# Patient Record
Sex: FEMALE | Race: AMERICAN INDIAN OR ALASKA NATIVE
[De-identification: names, ages, dates, MRNs, and addresses within clinical notes are randomized per-mention and may not be internally consistent; named-entity substitution may affect disease eponyms.]

---

## 2018-05-16 ENCOUNTER — HOSPITAL ENCOUNTER (OUTPATIENT)
Dept: HOSPITAL 42 - ED | Age: 28
Setting detail: OBSERVATION
LOS: 2 days | Discharge: LEFT BEFORE DISCHARGE | End: 2018-05-18
Attending: INTERNAL MEDICINE | Admitting: INTERNAL MEDICINE
Payer: COMMERCIAL

## 2018-05-16 VITALS — BODY MASS INDEX: 33 KG/M2

## 2018-05-16 DIAGNOSIS — Z87.820: ICD-10-CM

## 2018-05-16 DIAGNOSIS — R51: ICD-10-CM

## 2018-05-16 DIAGNOSIS — R94.31: ICD-10-CM

## 2018-05-16 DIAGNOSIS — F84.0: ICD-10-CM

## 2018-05-16 DIAGNOSIS — N39.0: ICD-10-CM

## 2018-05-16 DIAGNOSIS — G47.00: ICD-10-CM

## 2018-05-16 DIAGNOSIS — E78.00: ICD-10-CM

## 2018-05-16 DIAGNOSIS — E66.9: ICD-10-CM

## 2018-05-16 DIAGNOSIS — R41.82: Primary | ICD-10-CM

## 2018-05-16 DIAGNOSIS — F41.0: ICD-10-CM

## 2018-05-16 DIAGNOSIS — F94.0: ICD-10-CM

## 2018-05-16 DIAGNOSIS — R56.9: ICD-10-CM

## 2018-05-16 LAB
ALBUMIN SERPL-MCNC: 4.4 G/DL (ref 3–4.8)
ALBUMIN/GLOB SERPL: 1.1 {RATIO} (ref 1.1–1.8)
ALT SERPL-CCNC: 26 U/L (ref 7–56)
APPEARANCE UR: (no result)
AST SERPL-CCNC: 32 U/L (ref 14–36)
BACTERIA #/AREA URNS HPF: (no result) /[HPF]
BASOPHILS # BLD AUTO: 0.06 K/MM3 (ref 0–2)
BASOPHILS NFR BLD: 0.8 % (ref 0–3)
BILIRUB UR-MCNC: NEGATIVE MG/DL
BUN SERPL-MCNC: 11 MG/DL (ref 7–21)
CALCIUM SERPL-MCNC: 9.2 MG/DL (ref 8.4–10.5)
CK MB SERPL-MCNC: 1.4 NG/ML (ref 0–3.6)
COLOR UR: YELLOW
EOSINOPHIL # BLD: 0.1 10*3/UL (ref 0–0.7)
EOSINOPHIL NFR BLD: 1.5 % (ref 1.5–5)
EPI CELLS #/AREA URNS HPF: (no result) /HPF (ref 0–5)
ERYTHROCYTE [DISTWIDTH] IN BLOOD BY AUTOMATED COUNT: 12 % (ref 11.5–14.5)
GFR NON-AFRICAN AMERICAN: > 60
GLUCOSE UR STRIP-MCNC: NEGATIVE MG/DL
GRANULOCYTES # BLD: 3.23 10*3/UL (ref 1.4–6.5)
GRANULOCYTES NFR BLD: 45.2 % (ref 50–68)
HDLC SERPL-MCNC: 40 MG/DL (ref 29–60)
HGB BLD-MCNC: 13.3 G/DL (ref 12–16)
LDLC SERPL-MCNC: 163 MG/DL (ref 0–129)
LEUKOCYTE ESTERASE UR-ACNC: NEGATIVE LEU/UL
LYMPHOCYTES # BLD: 3.3 10*3/UL (ref 1.2–3.4)
LYMPHOCYTES NFR BLD AUTO: 46.1 % (ref 22–35)
MCH RBC QN AUTO: 27.5 PG (ref 25–35)
MCHC RBC AUTO-ENTMCNC: 33.6 G/DL (ref 31–37)
MCV RBC AUTO: 82 FL (ref 80–105)
MONOCYTES # BLD AUTO: 0.5 10*3/UL (ref 0.1–0.6)
MONOCYTES NFR BLD: 6.4 % (ref 1–6)
PH UR STRIP: 5.5 [PH] (ref 4.7–8)
PLATELET # BLD: 318 10^3/UL (ref 120–450)
PMV BLD AUTO: 9.4 FL (ref 7–11)
PROT UR STRIP-MCNC: 30 MG/DL
RBC # BLD AUTO: 4.83 10^6/UL (ref 3.5–6.1)
RBC # UR STRIP: (no result) /UL
SP GR UR STRIP: >= 1.03 (ref 1–1.03)
T4 FREE SERPL-MCNC: 1.04 NG/DL (ref 0.78–2.19)
UROBILINOGEN UR STRIP-ACNC: 0.2 E.U./DL
WBC # BLD AUTO: 7.2 10^3/UL (ref 4.5–11)

## 2018-05-16 NOTE — CT
EXAM:

  CT Head Without Intravenous Contrast



EXAM DATE/TIME:

  5/16/2018 5:24 PM



CLINICAL HISTORY:

  27 years old, female; Signs and symptoms; Other: ? ? Seizure, anxiety; 

Additional info: ? ? Seizure



TECHNIQUE:

  Axial computed tomography images of the head/brain without intravenous 

contrast.  All CT scans at this facility use one or more dose reduction 

techniques, viz.: automated exposure control; ma/kV adjustment per patient size 

(including targeted exams where dose is matched to indication; i.e. head); or 

iterative reconstruction technique.

  Coronal and sagittal reformatted images were created and reviewed.



COMPARISON:

  There are no prior studies for comparison.



FINDINGS:

  Brain and ventricles:  Ventricles are normal in size and configuration. There 

is no midline shift. There are no intra-axial or extra-axial mass lesions or 

areas of hemorrhage. There are no abnormal fluid collections. Gray-white 

differentiation is maintained.

  Bones: Cranial vault is intact.

  Soft tissues:  unremarkable

  Sinuses:  There is no acute sinusitis.

  Ears and mastoids: Middle ears and mastoids are unremarkable

  Orbits:  Orbital contents are unremarkable.

  



IMPRESSION:  No acute intracranial abnormality

## 2018-05-16 NOTE — ED PDOC
Arrival/HPI





- General


Chief Complaint: Anxiety


Time Seen by Provider: 05/16/18 15:29


Historian: Spouse





- History of Present Illness


Narrative History of Present Illness (Text): 





05/16/18 19:00


28yo female with Past medical history of traumatic brain injury and possible 

secure bib EMS for anxiety/seizure episode. According to patient's  by 

the bedside, patient has been having seizure like episodes intermittently since 

February this year. States she was seen at [facility] and Head CT, 

MRI and EEG was all negative. The  states she was seen by Neurologist, 

Dr. Cortez today and had another episode while she was there. she was brought 

to Emergency department from Dr. Cortez's office. Patient was aphasic, but 

alert and awake. She was teary, not answering questions.











Past Medical History





- Provider Review


Nursing Documentation Reviewed: Yes





- Infectious Disease


Hx of Infectious Diseases: None





- Psychiatric


Hx Substance Use: No





Family/Social History





- Physician Review


Nursing Documentation Reviewed: Yes


Family/Social History: Unknown Family HX


Smoking Status: Unknown If Ever Smoked


Hx Alcohol Use: No


Hx Substance Use: No





Allergies/Home Meds


Allergies/Adverse Reactions: 


Allergies





Penicillins Allergy (Verified 05/16/18 15:18)


 RASH








Home Medications: 


 Home Meds











 Medication  Instructions  Recorded  Confirmed


 


No Known Home Med  05/16/18 05/16/18














Review of Systems





- Physician Review


All systems were reviewed & negative as marked: Yes





- Review of Systems


Constitutional: Normal


Eyes: Normal


ENT: Normal


Respiratory: Normal


Cardiovascular: Normal


Gastrointestinal: Normal


Genitourinary Female: Normal


Musculoskeletal: Normal


Skin: Normal


Neurological: Seizure


Endocrine: Normal


Hemo/Lymphatic: Normal


Psychiatric: Normal





Physical Exam


Vital Signs Reviewed: Yes


Vital Signs











  Temp Pulse Resp BP Pulse Ox


 


 05/16/18 14:41  98.2 F  74  18  123/82  99











Temperature: Afebrile


Blood Pressure: Normal


Pulse: Regular


Respiratory Rate: Normal


Appearance: Positive for: Well-Appearing, Non-Toxic, Comfortable, Other (Teary)


Pain Distress: None


Mental Status: Positive for: Alert and Oriented X 3





- Systems Exam


Head: Present: Atraumatic, Normocephalic


Pupils: Present: PERRL


Extroacular Muscles: Present: EOMI


Conjunctiva: Present: Normal


Mouth: Present: Moist Mucous Membranes


Neck: Present: Normal Range of Motion


Respiratory/Chest: Present: Clear to Auscultation, Good Air Exchange.  No: 

Respiratory Distress, Accessory Muscle Use


Cardiovascular: Present: Regular Rate and Rhythm, Normal S1, S2.  No: Murmurs


Abdomen: No: Tenderness, Distention, Peritoneal Signs


Back: Present: Normal Inspection


Upper Extremity: Present: Normal Inspection.  No: Cyanosis, Edema


Lower Extremity: Present: Normal Inspection.  No: Edema


Neurological: Present: GCS=15, CN II-XII Intact, Speech Normal


Skin: Present: Warm, Dry, Normal Color.  No: Rashes


Psychiatric: Present: Alert, Oriented x 3, Normal Insight, Normal Concentration





Medical Decision Making


ED Course and Treatment: 





05/16/18 20:46


Pt in Emergency department for stated history. Pt is aphasic in Emergency 

department. She was however neurologically intact. 








Her lab was unremarkable. she have UTI and was treated with Rocephin.


Head CT - Negative


Case was DW Dr. Schaffer and he accepted pt for admission for further evaluation.





pt was seen in Emergency department by ELROY Arango and she was 

placed on consult for depression





Dr. Cortez was consult.











- Lab Interpretations


Lab Results: 








 05/16/18 16:55 





 05/16/18 16:55 





 Lab Results





05/16/18 17:00: Beta HCG, Quant < 2.39


05/16/18 16:55: Alcohol, Quantitative < 10


05/16/18 16:55: Sodium 143, Potassium 4.0, Chloride 105, Carbon Dioxide 22, 

Anion Gap 19, BUN 11, Creatinine 0.7, Est GFR (African Amer) > 60, Est GFR (Non-

Af Amer) > 60, Random Glucose 87, Calcium 9.2, Magnesium 1.7, Total Bilirubin 

0.5, AST 32, ALT 26, Alkaline Phosphatase 69, Total Creatine Kinase 196, Total 

Protein 8.4 H, Albumin 4.4, Globulin 4.0, Albumin/Globulin Ratio 1.1


05/16/18 16:55: WBC 7.2, RBC 4.83, Hgb 13.3, Hct 39.6, MCV 82.0, MCH 27.5, MCHC 

33.6, RDW 12.0, Plt Count 318, MPV 9.4, Gran % 45.2 L, Lymph % (Auto) 46.1 H, 

Mono % (Auto) 6.4 H, Eos % (Auto) 1.5, Baso % (Auto) 0.8, Gran # 3.23, Lymph # (

Auto) 3.3, Mono # (Auto) 0.5, Eos # (Auto) 0.1, Baso # (Auto) 0.06











- RAD Interpretation


Radiology Orders: 








05/16/18 17:24


HEAD W/O CONTRAST [CT] Stat 














- Medication Orders


Current Medication Orders: 








Levofloxacin/Dextrose (Levaquin 500mg)  500 mg in 100 mls @ 100 mls/hr IVPB 

STAT STA


   PRN Reason: Protocol


   Stop: 05/16/18 20:51


Sodium Chloride (Sodium Chloride 0.9%)  1,000 mls @ 999 mls/hr IV .Q1H1M STA


   Stop: 05/16/18 20:52





Discontinued Medications





Acetaminophen (Tylenol 325mg Tab)  650 mg PO STAT STA


   Stop: 05/16/18 19:01


   Last Admin: 05/16/18 19:18  Dose: 650 mg





Lorazepam (Ativan)  2 mg IM ONCE ONE


   PRN Reason: Protocol


   Stop: 05/16/18 16:15


   Last Admin: 05/16/18 16:28  Dose: 2 mg





IM Administration Charges


 Document     05/16/18 16:28  GMD  (Rec: 05/16/18 16:28  GMD  [id number])


     Injection Site


      MAR Injection Site                         Left Deltoid


     Charges for Administration


      # of IM Administrations                    1














Disposition/Present on Arrival





- Present on Arrival


Any Indicators Present on Arrival: No


History of DVT/PE: No


History of Uncontrolled Diabetes: No


Urinary Catheter: No


History of Decub. Ulcer: No


History Surgical Site Infection Following: None





- Disposition


Have Diagnosis and Disposition been Completed?: Yes


Diagnosis: 


 Altered mental status, Seizure, Anxiety, UTI (urinary tract infection)





Disposition: HOSPITALIZED


Disposition Time: 18:45


Patient Plan: Admission


Patient Problems: 


 Current Active Problems











Problem Status Onset


 


Altered mental status Acute  


 


Anxiety Acute  


 


Seizure Acute  


 


UTI (urinary tract infection) Acute  











Condition: FAIR

## 2018-05-17 LAB
ALBUMIN SERPL-MCNC: 4 G/DL (ref 3–4.8)
ALBUMIN/GLOB SERPL: 1.1 {RATIO} (ref 1.1–1.8)
ALT SERPL-CCNC: 25 U/L (ref 7–56)
AST SERPL-CCNC: 31 U/L (ref 14–36)
BASOPHILS # BLD AUTO: 0.05 K/MM3 (ref 0–2)
BASOPHILS NFR BLD: 0.7 % (ref 0–3)
BUN SERPL-MCNC: 8 MG/DL (ref 7–21)
CALCIUM SERPL-MCNC: 8.9 MG/DL (ref 8.4–10.5)
CK MB SERPL-MCNC: 1.1 NG/ML (ref 0–3.6)
EOSINOPHIL # BLD: 0.2 10*3/UL (ref 0–0.7)
EOSINOPHIL NFR BLD: 3.4 % (ref 1.5–5)
ERYTHROCYTE [DISTWIDTH] IN BLOOD BY AUTOMATED COUNT: 12.1 % (ref 11.5–14.5)
FOLATE SERPL-MCNC: 9.1 NG/ML
GFR NON-AFRICAN AMERICAN: > 60
GRANULOCYTES # BLD: 2.34 10*3/UL (ref 1.4–6.5)
GRANULOCYTES NFR BLD: 34.5 % (ref 50–68)
HEPATITIS A IGM: NEGATIVE
HEPATITIS B CORE AB: NEGATIVE
HEPATITIS C ANTIBODY: NEGATIVE
HGB BLD-MCNC: 13 G/DL (ref 12–16)
LYMPHOCYTES # BLD: 3.6 10*3/UL (ref 1.2–3.4)
LYMPHOCYTES NFR BLD AUTO: 53.2 % (ref 22–35)
MCH RBC QN AUTO: 27.3 PG (ref 25–35)
MCHC RBC AUTO-ENTMCNC: 33.2 G/DL (ref 31–37)
MCV RBC AUTO: 82.1 FL (ref 80–105)
MONOCYTES # BLD AUTO: 0.6 10*3/UL (ref 0.1–0.6)
MONOCYTES NFR BLD: 8.2 % (ref 1–6)
PLATELET # BLD: 296 10^3/UL (ref 120–450)
PMV BLD AUTO: 9.3 FL (ref 7–11)
RBC # BLD AUTO: 4.76 10^6/UL (ref 3.5–6.1)
T4 FREE SERPL-MCNC: 0.89 NG/DL (ref 0.78–2.19)
T4 SERPL-MCNC: 6.5 UG/DL (ref 5.5–11)
TROPONIN I SERPL-MCNC: < 0.01 NG/ML
VIT B12 SERPL-MCNC: 511 PG/ML (ref 239–931)
WBC # BLD AUTO: 6.8 10^3/UL (ref 4.5–11)

## 2018-05-17 RX ADMIN — ENOXAPARIN SODIUM SCH MG: 30 INJECTION SUBCUTANEOUS at 15:07

## 2018-05-17 RX ADMIN — LEVOFLOXACIN SCH MG: 5 INJECTION, SOLUTION INTRAVENOUS at 15:07

## 2018-05-17 NOTE — CON
DATE:  05/17/2018



HISTORY OF PRESENT ILLNESS:  Shortly, the patient is 27-year-old

-American female, with a reported history of traumatic brain injury.

The patient also has questionable history of anxiety and questionable

history of seizure disorder.  The patient was brought in for evaluation of

possible seizure-like activities observed by her .  Psych consult

was called for evaluation of depressive symptoms.  Also, the patient has

selective mutism, also for evaluation of anxiety.  The patient was seen and

examined.  The patient was selectively mute.  The patient is willing to

talk to this writer, but obviously was not able to do so.  As per ,

she talked earlier today, but not now.  As per , who is next to the

patient, the patient has such behavior when she is stressed out.  The

patient reported that she feels depressed, hopeless and helpless.  She was

answering all of the questions by nodding her head.  The patient reported

at times she feels guilty and hopeless.  When this writer asked about kids,

the patient started to cry, was not able to calm down.  As per , the

patient had 2 kids from the previous relationship, who are 9 and 4 and at

present moment they were adopted.  The patient is not having any visitation

rights.  When this writer asked why she gave up her maternal rights, the

patient's  said it was voluntary because she was not able to take

care of them.  Collaterals were obtained from the , the patient gave

permission to talk to him.  As per , the patient was stressed out

with her work, her  and she lives in adult facility and 

takes care of.  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

she is assisting them with some of their needs.  As per , the

patient was stressed out and asked her  to quit the job and at

present moment they wanted to find a new place to live.  As per ,

the patient was feeling depressed.  The patient attempted to contact

adopted family for her kids and asked to have visitation rights, but family

was uncooperative.  Moreover, the patient got to know that both of the kids

have autistic spectrum disorder.  The patient denied that she has thoughts

of killing herself or others.  The patient reported that at times she feels

unease because she feels she is followed up by strangers and she is going

to be hurt.  Denied hearing voices, denied seeing things.  The patient

reported no previous history of mental illness.  The patient denied history

of suicidal attempts.  The patient denied ever been admitted to the

psychiatric inpatient unit.  No drugs were involved into the patient care

until the patient _____.



Vital signs are stable.  Temperature 98.2, pulse is 68, blood pressure

120/78, respirations 20, oxygen saturation is 100.



Medications reviewed.  The patient is on Lipitor, Lovenox, Levaquin, Ativan

IV push as needed for seizure activities.  This writer will implement

Ativan 0.5 mg three times a day as needed for anxiety.  This writer

educated the patient and her  about Remeron for depression and

insomnia.  This writer provided printout of the patient information.  This

is to be appreciated.



Labs reviewed.  Chemistry reviewed.  Urinalysis showed some nitrites. 

Toxicology negative for any drugs.



MENTAL STATUS EXAMINATION:  The patient appears to be depressed, tearful,

acceptable personal hygiene, was not able to talk, possibly has selective

mutism.  Mood described as depressed and hopeless.  Affect was tearful,

mood congruent.  Thought process seems to be goal directed, but it is hard

to see because the patient was not able to talk, but answering for the

questions seems to be appropriately.  Insight and judgment unable to

assess, but most likely is fair because the patient was willing to be

admitted to the medical side.  Does not have any behavioral issues. 

Impulses are well controlled.



IMPRESSION:  Rule out major depressive disorder, rule out personality

disorder, rule out pseudoseizures.



PLAN:  This writer will start Remeron at the nighttime with benefits and

alternatives discussed.  Collaterals were obtained from the patient's

, who is next to her, his name is Clifton Elam.  The writer will

follow up on this patient.  The patient needs to be seen by neurologist. 

We will follow up and advise accordingly.



Thank you very much for letting me participate in care of your patient.





__________________________________________

Roseline Khan MD



DD:  05/17/2018 11:24:44

DT:  05/17/2018 11:30:15

Job # [id number]

## 2018-05-17 NOTE — CARD
--------------- APPROVED REPORT --------------





EXAM: Two-dimensional and M-mode echocardiogram with Doppler and 

color Doppler.



INDICATION

Syncope 



2D DIMENSIONS 

Left Atrium (2D)4.0   (1.6-4.0cm)IVSd0.7   (0.7-1.1cm)

LVDd4.5   (3.9-5.9cm)PWd0.8   (0.7-1.1cm)

LVDs3.2   (2.5-4.0cm)FS (%) 28.3   %

LVEF (%)54.8   (>50%)



M-Mode DIMENSIONS 

Aortic Root2.80   (2.2-3.7cm)Aortic Cusp Exc.1.80   (1.5-2.0cm)



Aortic Valve

AoV Peak Qeyfxqzu294.0cm/Ana Peak GR.5mmHg



Mitral Valve

MV E Wbousngd54.1cm/sMV A Zghrqabi99.9cm/sE/A ratio1.5



TDI

Lateral E' Peak V17.50cm/sMedial E' Peak V9.46cm/sE/Lateral E'4.2

E/Medial E'7.7



Pulmonary Valve

PV Peak Swdnhgfz92.9cm/sPV Peak Grad.1mmHg



Tricuspid Valve

TR Peak Hqujpgfx277ku/sRAP OMAGOMRO25xfHnNZ Peak Gr.15mmHg

DRBA75fbJe



 LEFT VENTRICLE 

The left ventricle is normal size. There is normal left ventricular 

wall thickness. The left ventricular function is normal. The left 

ventricular ejection fraction is within the normal range. There is 

normal LV segmental wall motion. The left ventricular diastolic 

function is normal.



 RIGHT VENTRICLE 

The right ventricle is normal size. There is normal right ventricular 

wall thickness. The right ventricular systolic function is normal.



 ATRIA 

The left atrium size is normal. The right atrium size is normal.



 AORTIC VALVE 

The aortic valve is normal in structure. No aortic regurgitation is 

present. There is no aortic valvular stenosis. 



 MITRAL VALVE 

The mitral valve is normal in structure. There is no mitral valve 

regurgitation noted. There is no mitral valve stenosis.



 TRICUSPID VALVE 

The tricuspid valve is normal in structure. There is trace tricuspid 

regurgitation.



 GREAT VESSELS 

The aortic root is normal in size. The IVC is normal in size and 

collapses >50% with inspiration.



 PERICARDIAL EFFUSION 

Pericardium appears mildly thickened.



<Conclusion>

The left ventricle is normal size.

There is normal left ventricular wall thickness.

The left ventricular function is normal.

The left ventricular ejection fraction is within the normal range.

There is normal LV segmental wall motion.

The left ventricular diastolic function is normal.

## 2018-05-17 NOTE — CARD
--------------- APPROVED REPORT --------------





EKG Measurement

Heart [id number]

LA [id number]

[id number]

[id number]

[id number]



<Conclusion>

*** Poor data quality, interpretation may be adversely affected

Normal sinus rhythm

T wave abnormality, consider anterior ischemia

Abnormal ECG

## 2018-05-17 NOTE — US
PROCEDURE:  Bilateral carotid artery duplex ultrasound 



HISTORY:

Carotid stenosis syncope



PHYSICIAN(S):  Ki Johnston MD.



TECHNIQUE:

Duplex sonography and color-flow Doppler were used to evaluate the 

carotid bifurcations and limited segments of the vertebral arteries 

bilaterally.



FINDINGS:

There is mild smooth intimal - medial thickening noted at the carotid 

bifurcations bilaterally. The peak systolic velocity in the proximal 

right internal carotid artery is 69 cm/sec. This corresponds to a 

0-19 percent proximal right ICA stenosis. Normal systolic velocities 

are noted in the proximal right external carotid artery. There is 

antegrade flow in the right vertebral artery.



The peak systolic velocity in the proximal left internal carotid 

artery is 77 cm/sec. This corresponds to a 0-19 percent proximal left 

ICA stenosis. Normal systolic velocities are noted in the proximal 

left external carotid artery. There is antegrade flow in the dominant 

left vertebral artery.



IMPRESSION:

1. Bilateral 0-19 percent proximal ICA stenoses.



2. Antegrade flow in both vertebral arteries.

## 2018-05-17 NOTE — PN
DATE:  05/17/2018



SUBJECTIVE:  The patient is seen in telemetry room 277, bed 1.  The patient

is lying in the bed.  According to the patient's nurse, the patient has

some involuntary movement.  Telemetry shows sinus rhythm.  The patient at

present is lying in the bed, but nonverbal.  Eyes are open, responsive.



PHYSICAL EXAMINATION:

VITAL SIGNS:  T-max 98.2; heart rate 68-69, telemetry shows sinus rhythm;

blood pressure 120/78; respirations 12; O2 sat 100%.

HEENT:  Head examination normocephalic, atraumatic.  HEENT examination

shows pinkish conjunctivae.  Anicteric sclerae.  No oropharyngeal lesion. 

No neck rigidity noted.

GENERAL:  The patient is lying in the bed.  Does not appear to be in any

distress at present.  No facial asymmetry noted.  The patient makes some

mumbling sound, appears like yes and no.

CARDIOVASCULAR:  S1, S2.  Regular rhythm.

LUNGS:  Shows no rales, crackles or wheezing.

ABDOMEN:  Soft.  Positive bowel sounds.  Protuberant.  No guarding,

rigidity.  No tenderness noted.  No hepatosplenomegaly noted.

GENITALIA:  Female.

RECTAL:  Deferred.

EXTREMITY:  Shows no pitting edema, no calf tenderness, no Homans' sign.

NEUROLOGICAL:  The patient is alert, awake.  Eyes open.  Appears that the

patient is able to hear us, but does not want to respond verbally and the

patient is nonverbal, but making some mumbling sound.  Neurological

examination is limited.  Gait examination could not be tested.

MUSCULOSKELETAL:  Shows a body mass index of 33.



DIAGNOSTICS:  On 05/17/2018, WBC 6.8, hemoglobin and hematocrit 13 and

39.1, platelet 296.  Sodium 143, potassium 4, chloride 108, CO2 of 23,

anion gap 16, BUN 8, creatinine 0.7, GFR greater than 60, glucose 92,

calcium 8.9.  Magnesium result is pending.  LFTs are normal.  CPK is 372,

which is slightly elevated.  Troponin is negative.  Thyroid profile is

normal.  Cholesterol is 247, , HDL 40.  Urinalysis:  30 protein,

small blood, positive nitrite, many bacteria.  Urine drug screen, alcohol

level negative.  Head CT was done, which was negative.  EKG was done, sinus

rhythm, T-wave inversion V1 to V4 to V5.  Questionable anterior ischemia. 

The patient's old EKG is not available for comparison.



IMPRESSION AND PLAN:

1.  Questionable episodic seizure versus involuntary generalized movements

of the body.

2.  Nonverbal state.

3.  Questionable catatonia.

4.  Mild lymphocytosis.

5.  Hypercholesterolemia with elevated LDL.

6.  Obesity with elevated body mass index of 33.

7.  Questionable urinary tract infection with proteinuria, microscopic

hematuria, pyuria, bacteriuria.

8.  Abnormal EKG with T-wave inversion V1 to V5.  Questionable anterior

ischemic changes on the EKG.

9.  Questionable history of seizure disorder, seen and treated at [facility] and [facility].  Records are not

available.

10.  Questionable seizure-like activity versus pseudoseizure versus

catatonic state.



Plan at this time, the patient was seen and examined in room 277, bed 1. 

The patient's  was updated about the patient's condition, diagnosis,

test results.  The patient has been ordered repeat lab for the morning. 

Hepatitis serologies are pending.  B12 is pending.  Lyme titers, HIV

pending.  Repeat CBC ordered for tomorrow, but 2 CBCs are consistently

normal that will be discontinued.  The patient's urine culture is pending. 

Current consultation, Cardiology, evaluation of her abnormal EKG. 

Neurology evaluation and Psychiatry evaluation.  RPR is pending.  Current

medications:  The patient is on an Ativan 1 mg IV every 4 hours p.r.n.,

hold for sedation.  The patient is on lactated Ringer's at 180 mL an hour. 

The patient is on Levaquin 750 mg IV daily, Lipitor 40 mg daily, Lovenox 30

mg subcu daily, Protonix 40 mg daily.  Carotid Doppler pending.  MRI, MRA

brain pending.  Echo pending.  EEG is done.  The patient is awaiting for

swallowing evaluation.  The patient is awaiting occupational therapy,

physical therapy evaluation.  SCDs, PERLA stockings ordered.



Dictated and electronically signed, not read.





__________________________________________

Ernesto Schaffer MD





DD:  05/17/2018 11:16:11

DT:  05/17/2018 11:21:52

Job # [id number]

## 2018-05-17 NOTE — CP.PCM.HP
<Chris Boles - Last Filed: 05/17/18 00:32>





History of Present Illness





- History of Present Illness


History of Present Illness: 





CC: Seizure like activity





HPI: 


Patient is a 27 year old female with no medical history who presents to [facility] ED 

for suspected siezure like acitivty. Patient is nonverbal at time of interview 

and is able to answer yes or no to questions as well as ability to text with 

her left hand.  is also at bedside and assists in providing patient 

history. Starting in Feb of this year patient has been having episodes of 

seizure like activity(~10 in total).  describes these episodes as 

starting with aura of headache blurry vision, followed by full body convulsions 

lasting anywhere from a few seconds to 5 minutes. Patient reports that 

following episodes she develops combination of paralysis, weakness and numbness 

as well as becoming non verbal. Patient is reported to have slow recovery and 

return within 1 hour to 24 hours to baseline of being fully verbal and with 

normal strength and feeling post episode. Patient family reports that episodes 

are usually surrounding high stress events. At onset of these symptoms in Feb 

patient and  were planning their wedding. Patient most recent episode 

was today while visiting Dr. Cortez's office for intial evaluation for 

episodes. Patinet was noted to have shaking activity, no bowel or urine 

incontinence, biting of tongue, post ictal state. patient did develop right 

upper extremity paralysis, right and left lower extremity weakness as well as 

bilateral sided facial numbaess. Patient admits to headaches, dizziness type 

aura preceding attacks. Patient was [place] [facility] 

where HEad CT, MRI and EEG were preformed and according to family and records 

were negative. 12 point ROS benign other than mentioned in HPI.  





PMH: Denies


PSH: Denies


FMH: Denies


SOCHX: Denies tobacco, ETOH, ID


ALL: PCN


MEDS: Denies





PMD: none


Neuro: Diego ( first visit yesterday)





Present on Admission





- Present on Admission


Any Indicators Present on Admission: No





Review of Systems





- Review of Systems


All systems: reviewed and no additional remarkable complaints except (as 

mentioned in HPI)





Past Patient History





- Infectious Disease


Hx of Infectious Diseases: None





- Past Social History


Smoking Status: Never Smoked


Alcohol: None


Drugs: Denies





- NEUROLOGICAL


Hx Seizures: Yes (r/o seizure hx)





- PSYCHIATRIC


Hx Substance Use: No





Meds


Allergies/Adverse Reactions: 


 Allergies











Allergy/AdvReac Type Severity Reaction Status Date / Time


 


Penicillins Allergy  RASH Verified 05/16/18 15:18














Physical Exam





- Constitutional


Appears: No Acute Distress





- Head Exam


Head Exam: ATRAUMATIC, NORMAL INSPECTION, NORMOCEPHALIC





- Eye Exam


Eye Exam: EOMI, PERRL





- ENT Exam


ENT Exam: Mucous Membranes Moist





- Respiratory Exam


Respiratory Exam: Clear to Auscultation Bilateral, NORMAL BREATHING PATTERN.  

absent: Rhonchi, Wheezes





- Cardiovascular Exam


Cardiovascular Exam: REGULAR RHYTHM, +S1, +S2





- GI/Abdominal Exam


GI & Abdominal Exam: Normal Bowel Sounds, Soft.  absent: Firm, Guarding, 

Tenderness





- Extremities Exam


Extremities exam: Positive for: normal capillary refill, pedal pulses present.  

Negative for: calf tenderness, tenderness





- Neurological Exam


Neurological exam: Alert


Additional comments: 





GCS 12


Aphasic


Right Upper extremity paralysis, strength 0/5, sensory intact


Right lower extremity strength: 2/5 


Left Upper extremity strength: 3/5 


Left lower extremity strength: 2/5  














- Psychiatric Exam


Psychiatric exam: Flat Affect





- Skin


Skin Exam: Dry, Warm


Additional comments: 





Multiple tattoos appreciated 





Results





- Vital Signs


Recent Vital Signs: 





 Last Vital Signs











Temp  98.1 F   05/16/18 23:45


 


Pulse  72   05/16/18 23:45


 


Resp  18   05/16/18 23:45


 


BP  115/70   05/16/18 23:45


 


Pulse Ox  97   05/16/18 21:32














- Labs


Result Diagrams: 


 05/16/18 16:55





 05/16/18 16:55


Labs: 





 Laboratory Results - last 24 hr











  05/16/18 05/16/18 05/16/18





  19:19 19:19 20:00


 


Total Creatine Kinase   


 


CK-MB (CK-2)   


 


CK-MB (CK-2) %   


 


Triglycerides   


 


Cholesterol   


 


LDL Cholesterol Direct   


 


HDL Cholesterol   


 


Free T4    1.04


 


TSH 3rd Generation    1.43


 


Urine Color  Yellow  


 


Urine Appearance  Sl cloudy  


 


Urine pH  5.5  


 


Ur Specific Gravity  >= 1.030  


 


Urine Protein  30 H  


 


Urine Glucose (UA)  Negative  


 


Urine Ketones  Negative  


 


Urine Blood  Small H  


 


Urine Nitrate  Positive H  


 


Urine Bilirubin  Negative  


 


Urine Urobilinogen  0.2  


 


Ur Leukocyte Esterase  Negative  


 


Urine RBC  5 - 10  


 


Urine WBC  5 - 10  


 


Ur Epithelial Cells  Many  


 


Urine Bacteria  Many  


 


Urine Opiates Screen   Negative 


 


Urine Methadone Screen   Negative 


 


Ur Barbiturates Screen   Negative 


 


Ur Phencyclidine Scrn   Negative 


 


Ur Amphetamines Screen   Negative 


 


U Benzodiazepines Scrn   Negative 


 


U Oth Cocaine Metabols   Negative 


 


U Cannabinoids Screen   Negative 














  05/16/18





  23:04


 


Total Creatine Kinase  306 H


 


CK-MB (CK-2)  1.4


 


CK-MB (CK-2) %  Cancelled


 


Triglycerides  Cancelled


 


Cholesterol  Cancelled


 


LDL Cholesterol Direct  Cancelled


 


HDL Cholesterol  Cancelled


 


Free T4 


 


TSH 3rd Generation 


 


Urine Color 


 


Urine Appearance 


 


Urine pH 


 


Ur Specific Gravity 


 


Urine Protein 


 


Urine Glucose (UA) 


 


Urine Ketones 


 


Urine Blood 


 


Urine Nitrate 


 


Urine Bilirubin 


 


Urine Urobilinogen 


 


Ur Leukocyte Esterase 


 


Urine RBC 


 


Urine WBC 


 


Ur Epithelial Cells 


 


Urine Bacteria 


 


Urine Opiates Screen 


 


Urine Methadone Screen 


 


Ur Barbiturates Screen 


 


Ur Phencyclidine Scrn 


 


Ur Amphetamines Screen 


 


U Benzodiazepines Scrn 


 


U Oth Cocaine Metabols 


 


U Cannabinoids Screen 














Assessment & Plan





- Assessment and Plan (Free Text)


Assessment: 





27 year old female with no medical history who presents to [facility] ED for suspected 

seizure like activity. Patient admitted for observation and further work up and 

management. 


Plan: 





Seizure like activity


- Etiology: Pseudoseizure vs. Seensory TIA vs. catatonic state vs. psychiatric 

manifestation vs. seizure 


- Head CT negative 


- Request [place] [facility] [place] [facility]


- TSH, RPR, Lyme disease


- Neuro Checks


- EEG


- Brain MRI w/ and w/o 


- Hepatitis serology, HIV, Lipid profile


- Neurology Consult


- Cardio consult


- Psych consult








DVT ppx: Lovenox


GI ppx: Protonix 





Case and plan discussed with attending





- Date & Time


Date: 05/17/18


Time: 22:15





<Ernesto Schaffer U - Last Filed: 05/20/18 09:44>





Results





- Vital Signs


Recent Vital Signs: 





 Last Vital Signs











Temp  98 F   05/18/18 11:43


 


Pulse  66   05/18/18 11:43


 


Resp  16   05/18/18 11:43


 


BP  109/65   05/18/18 11:43


 


Pulse Ox  95   05/18/18 09:00














- Labs


Result Diagrams: 


 05/17/18 06:00





 05/18/18 06:15





Attending/Attestation





- Attestation


I have personally seen and examined this patient.: Yes


I have fully participated in the care of the patient.: Yes


I have reviewed all pertinent clinical information: Yes

## 2018-05-17 NOTE — MRI
PROCEDURE:  MRI BRAIN WITH AND WITHOUT CONTRAST



HISTORY:

?seizure activity



COMPARISON:

Unenhanced head CT 05/16/2018. 



TECHNIQUE:

Multiplanar, multisequence MR images of the brain were obtained with 

and without intravenous contrast enhancement.



FINDINGS:



HEMORRHAGE:

None



DWI:

No evidence of an acute or early subacute infarction.



BRAIN PARENCHYMA:

Intrinsic signal throughout the gray and white matter structures 

above below the tentorium includes appears within normal limits 

including the brainstem.  There is no mass effect, parenchymal edema 

or loss of the corticomedullary differentiation. Midline brain 

anatomy appears within normal limits including the corpus callosum, 

brainstem and craniocervical junction. High-resolution imaging 

through the medial temporal lobes reveals no suspicious findings. 

There is no suspicious extra-axial fluid collection identified.



ENHANCEMENT:

No abnormal intracranial enhancement.



VENTRICLES:

Unremarkable. No hydrocephalus.



CRANIUM:

Unremarkable.



ORBITS:

Grossly unremarkable.



PARANASAL SINUSES/MASTOIDS:

Clear



VASCULAR SYSTEM:

Skull base flow voids intact.



OTHER FINDINGS:

None .



IMPRESSION:

Unremarkable pre and post contrast enhanced MRI of the brain.

## 2018-05-17 NOTE — MRI
PROCEDURE:  Magnetic Resonance Angiography Brain



HISTORY:

??SYNCOPE







COMPARISON:

None available. 



TECHNIQUE:

3D time of flight MR angiography of the intracranial arteries was 

performed. Rotating maximum intensity projection images were 

generated.



FINDINGS:



INTERNAL CAROTID ARTERIES:

Unremarkable. The skull base, petrous, cavernous and supraclinoid 

segments are bilaterally widely patient. 



ANTERIOR CEREBRAL ARTERIES:

Unremarkable. A1 and A2 segments are widely patent. Smaller distal 

branches unremarkable, as visualized.



MIDDLE CEREBRAL ARTERIES:

Unremarkable. M1 and M2 segments are widely patent. Perisylvian 

branches grossly symmetric.



POSTERIOR CIRCULATION:

Basilar Artery: Widely patent without significant stenosis.



Distal Vertebral Arteries: Widely patent left dominant vertebral 

artery with hypoplastic distal right vertebral artery.



Posterior Cerebral Arteries: Unremarkable.



Posterior Inferior Cerebellar Arteries: Unremarkable.



ANEURYSM/ VASCULAR MALFORMATIONS:

None.



OTHER FINDINGS:

None. 



IMPRESSION:

Hypoplastic distal right vertebral artery with remainder the 

intracranial MR angiogram otherwise unremarkable. No occlusion or 

significant stenosis appreciable.

## 2018-05-18 VITALS — OXYGEN SATURATION: 95 %

## 2018-05-18 VITALS
RESPIRATION RATE: 16 BRPM | SYSTOLIC BLOOD PRESSURE: 109 MMHG | DIASTOLIC BLOOD PRESSURE: 65 MMHG | TEMPERATURE: 98 F | HEART RATE: 66 BPM

## 2018-05-18 LAB
ALBUMIN SERPL-MCNC: 4.1 G/DL (ref 3–4.8)
ALBUMIN/GLOB SERPL: 1.1 {RATIO} (ref 1.1–1.8)
ALT SERPL-CCNC: 30 U/L (ref 7–56)
AST SERPL-CCNC: 48 U/L (ref 14–36)
BUN SERPL-MCNC: 13 MG/DL (ref 7–21)
CALCIUM SERPL-MCNC: 9.2 MG/DL (ref 8.4–10.5)
GFR NON-AFRICAN AMERICAN: > 60

## 2018-05-18 RX ADMIN — LEVOFLOXACIN SCH: 5 INJECTION, SOLUTION INTRAVENOUS at 10:36

## 2018-05-18 RX ADMIN — ENOXAPARIN SODIUM SCH: 30 INJECTION SUBCUTANEOUS at 10:36

## 2018-05-18 NOTE — CP.PCM.PN
<Dl Vu - Last Filed: 05/23/18 14:03>





Subjective





- Date & Time of Evaluation


Date of Evaluation: 05/18/18


Time of Evaluation: 06:00





- Subjective


Subjective: 


Patient seen and examined bedside.no acute issues overnight. Patient still 

having trouble speaking, but denies any complaints at his time. no chest pain, 

headaches, shortness of breath, fever, chills or abdominal pain. 








Objective





- Vital Signs/Intake and Output


Vital Signs (last 24 hours): 


 











Temp Pulse Resp BP Pulse Ox


 


 98.4 F   74   20   107/59 L  99 


 


 05/18/18 06:00  05/18/18 06:00  05/18/18 06:00  05/18/18 06:00  05/18/18 06:00











- Medications


Medications: 


 Current Medications





Atorvastatin Calcium (Lipitor)  40 mg PO DIN [facility]


   Last Admin: 05/17/18 17:49 Dose:  40 mg


Enoxaparin Sodium (Lovenox)  30 mg SC DAILY [facility]


   PRN Reason: Protocol


   Last Admin: 05/17/18 15:07 Dose:  30 mg


Lactated Ringer's (Lactated Ringer's)  1,000 mls @ 80 mls/hr IV .[id number] [facility]


   Last Admin: 05/18/18 05:43 Dose:  80 mls/hr


Levofloxacin/Dextrose (Levaquin 750mg)  750 mg IVPB DAILY [facility]


   PRN Reason: Protocol


   Last Admin: 05/17/18 15:07 Dose:  750 mg


Lorazepam (Ativan)  1 mg IVP Q4H PRN; Protocol


   PRN Reason: Seizure activity


   Last Admin: 05/17/18 11:20 Dose:  1 mg


Lorazepam (Ativan)  0.5 mg PO TID PRN; Protocol


   PRN Reason: Anxiety


Mirtazapine (Remeron)  7.5 mg PO HS PRN


   PRN Reason: Insomnia


Pantoprazole Sodium (Protonix Inj)  40 mg IVP DAILY [facility]


   Last Admin: 05/17/18 15:07 Dose:  40 mg











- Labs


Labs: 


 





 05/17/18 06:00 





 05/18/18 06:15 











- Constitutional


Appears: Non-toxic, No Acute Distress





- Head Exam


Head Exam: ATRAUMATIC, NORMAL INSPECTION, NORMOCEPHALIC





- Eye Exam


Eye Exam: EOMI, Normal appearance





- ENT Exam


ENT Exam: Mucous Membranes Moist





- Respiratory Exam


Respiratory Exam: Clear to Ausculation Bilateral, NORMAL BREATHING PATTERN





- Cardiovascular Exam


Cardiovascular Exam: REGULAR RHYTHM





- Extremities Exam


Extremities Exam: absent: Pedal Edema





- Neurological Exam


Neurological Exam: Alert, Awake, Oriented x3





Assessment and Plan





- Assessment and Plan (Free Text)


Assessment: 


27 year old female with no medical history who presents to [facility] ED for suspected 

seizure like activity. Patient admitted for observation and further work up and 

management. 





Plan: 


Seizure like activity


- Etiology: Pseudoseizure vs. Sensory TIA vs. catatonic state vs. psychiatric 

manifestation vs. seizure 


- Head CT negative for acute change 


- TSH, RPR, Lyme disease negative 


- Neuro Checks


- EEG pending 


- echo shows normal EF


- Brain MRI w/ and w/o negative for acute pathology 


- Hepatitis serology, HIV, Lipid profile


- Neurology Consulted, Diego, follow recs


- Cardio consulted, isis, follow recs


- Psych consult, dr. tobar, follow recs


- remeron per psychiatry








DVT ppx: Lovenox


GI ppx: Protonix 








<Ernesto Schaffer U - Last Filed: 05/28/18 17:03>





Objective





- Vital Signs/Intake and Output


Vital Signs (last 24 hours): 


 











Temp Pulse Resp BP Pulse Ox


 


 98 F   66   16   109/65   95 


 


 05/18/18 11:43  05/18/18 11:43  05/18/18 11:43  05/18/18 11:43  05/18/18 09:00











- Labs


Labs: 


 





 05/17/18 06:00 





 05/18/18 06:15 











Attending/Attestation





- Attestation


I have personally seen and examined this patient.: Yes


I have fully participated in the care of the patient.: Yes


I have reviewed all pertinent clinical information, including history, physical 

exam and plan: Yes


Notes (Text): 


Please see/read my dictated notes.

## 2018-05-18 NOTE — EEG
DATE:  05/17/2018



ELECTROENCEPHALOGRAM



CONDITION OF THE RECORDING:  Awake, drowsy and photic stimulation was done.

No hyperventilation.



HISTORY:  Rule out seizure.



PAST MEDICAL HISTORY:  Possible seizure versus pseudoseizure.



DESCRIPTION:  Background activity of this tracing was composed of 9 to 10

cycles per second alpha rhythm, small amount of beta activity, 16 to 20

cycle per second was noted in the tracing.  Theta activity 5 to 7 cycles

per second was noted in the tracing.  Drowsiness was composed of mixed beta

and theta activity.  Photic stimulation does not change the record.  No

paroxysmal activity was seen in the record.



IMPRESSION:  Normal, awake, and drowsy electroencephalogram.







__________________________________________

Delroy Cortez MD



DD:  05/18/2018 11:55:33

DT:  05/18/2018 12:38:33

Job # [id number]

## 2018-05-18 NOTE — CON
DATE:  05/17/2018



CARDIOLOGY CONSULTATION



HISTORY:  The patient is a 27-year-old woman, who presents with altered

mental status.  She had seizure-like activity, but it is unclear whether it

is actually seizures.



The patient has no previous cardiac history, has no previous seizure

history.  No other history is available other than from her  who

gave limited history.



PHYSICAL EXAMINATION:

GENERAL:  The patient was confused with questionable seizure activity.

VITAL SIGNS:  Stable.

NECK:  Negative JVD.

LUNGS:  Without rales.

HEART:  Reveals S1, S2.

EXTREMITIES:  Without edema.



EKG shows normal sinus rhythm with diffuse ST-T changes.



LABORATORY DATA:  Laboratories were unremarkable.



IMPRESSION:

1.  Altered mental status.

2.  Questionable seizure disorder.

3.  The patient is for psychiatric evaluation.

4.  Abnormal electrocardiogram.



PLAN:  Given these findings, when the patient's neurologic status is

stable, we will obtain an echocardiogram to evaluate her LV function.  We

will continue on telemetry to monitor for arrhythmias.





__________________________________________

Ki Ibarra MD





DD:  05/17/2018 20:51:32

DT:  05/17/2018 20:55:11

Job # [id number]

## 2018-05-18 NOTE — PN
DATE:  05/18/2018



CARDIOLOGY FOLLOWUP



SUBJECTIVE:  The patient is awake, alert but non-cooperative in terms of

giving a history.



PHYSICAL EXAMINATION:

VITAL SIGNS:  Blood pressure is 107/60, the heart rates in the 60s.

NECK:  Negative JVD.

LUNGS:  Without rales.

HEART:  Reveals S1, S2.

EXTREMITIES:  Without edema.



LABORATORY DATA:  Hemoglobin is 13.  Chemistries:  BUN and creatinine are

unremarkable.



Echocardiogram reveals good LV function with no LVH.



IMPRESSION:

1.  Abnormal electrocardiogram.

2.  No evidence for left ventricular hypertrophy nor cardiomyopathy.

3.  History of pseudoseizures.

4.  Depressive disorder.



PLAN:  Given these findings, no further cardiac workup is necessary at this

time.  We will discontinue telemetry today.





__________________________________________

Ki Ibarra MD





DD:  05/18/2018 10:14:50

DT:  05/18/2018 10:17:35

Job # [id number]

## 2018-05-18 NOTE — CP.PCM.PCO
Addendum


Addendum: 





05/18/18 13:17


when this writer was doing round pt is in the process or leaving AMA


pt did not have any behavioral issues


neither pt or  willing to participate in conversation


pt might benefit from further evaluation and stabilization in psychiatric unit, 

but neither pt or her  interested 


pt is not agitated


pose no imminent danger to self or others. 


pt left AMA uneventfully

## 2018-05-18 NOTE — CON
DATE:



HISTORY OF PRESENT ILLNESS:  A 27-year-old black female with no significant

past medical history other than anxiety disorder and patient came to my

office and went into pseudoseizure like episode and complaining of headache

and may transfer her to hospital, whole workup was done, CAT scan of the

head, MRI, MRA, carotid Doppler, all reported negative and EEG was

performed, that is also within normal limits.  No tongue bite.  No urinary

incontinence.  The patient went to [facility] recently where these workup was done, was negative and

was discharged.



ALLERGIES:  THE PATIENT IS ALLERGIC TO PENICILLIN.



SOCIAL HISTORY:  Does not smoke, does not drink.



REVIEW OF SYSTEMS:  A 10-point review of systems was negative.



PHYSICAL EXAMINATION:

VITAL SIGNS:  Blood pressure 115/70.

HEENT:  Normocephalic, atraumatic.

NECK:  Supple.

NEUROLOGIC:  Alert, awake, oriented x3.  No aphasia.  Cranial nerves II

through XII are tested.  Pupils are reactive.  EOM intact.  No facial

asymmetry.  Tongue is midline.  Motor examination, moves all the

extremities equally.  Tone normal.  Deep tendon reflexes 1+.  Both plantars

are downgoing.  Sensory appears intact.  Cerebellar, gait normal.



LABORATORY DATA:  WBC 7.2, hemoglobin 13.3, hematocrit 39.6, platelets 318.

Sodium 143, potassium 4, chloride 105, CO2 22, glucose 87, BUN 11,

creatinine is 0.7.



IMPRESSION AND PLAN:  Pseudoseizure and severe anxiety disorder and called

Psychiatry to evaluate the patient and do further management.





__________________________________________

Delroy Cortez MD



DD:  05/18/2018 12:13:18

DT:  05/18/2018 13:13:46

Job # [id number]

## 2018-05-19 NOTE — DS
FINAL PROGRESS NOTE AND DISCHARGE SUMMARY



HISTORY OF PRESENT ILLNESS:  The patient is seen today in room 277, bed 1. 

The patient's condition was discussed with the overnight nurse and morning

nurse.  According to the patient's nurse, the patient had excellent

appetite and ate everything yesterday and the patient was found to be

communicating in the last 24 to _____ hours.  The patient was seen and

examined in room 277, bed 1.  The patient was seen lying in the bed.  The

patient is awake, responsive.  The patient is communicating today and

answering questions today.  The patient is alert, awake, and oriented to

person, place, and time.  The patient does not offer any specific

complaint, but the patient states "I am fine."  The patient also stated

that she is having episodic panic attacks.



PHYSICAL EXAMINATION:

VITAL SIGNS:  T-max 98.4; pulse 64, 74; blood pressure 114/73, 107/59;

respirations 20; O2 sat 99%.

GENERAL:  The patient is seen lying in the bed.  The patient still does not

make any eye contact.  The patient is awake, responsive, follows commands,

is able to sit up from lying position, move upper and lower extremity

without assistance.  No neuro deficit noted at this time.

HEENT:  Head:  Normocephalic, atraumatic.  Pink conjunctivae, anicteric

sclerae.  No oropharyngeal lesion.

NECK:  No neck rigidity.

CHEST:  Kyphosis.

LUNGS:  Shows no rales, crackles, or wheezing.

ABDOMEN:  Soft, positive bowel sounds.

GENITALIA:  Female.

RECTAL:  Deferred.

EXTREMITIES:  Shows no pitting edema, no calf tenderness, no Homans sign.

NEUROLOGICAL:  The patient is alert, awake, and responsive.  No gross

deficit noted.

MUSCULOSKELETAL:  Shows a body mass index of 32.3.



DIAGNOSTICS:  05/18/2018:  Chemistry shows sodium 142, potassium 3.9,

chloride 105, CO2 of 24, anion gap 17.  BUN 13, creatinine 0.8.  GFR

greater than 60.  Glucose 90.  Calcium 9.2, magnesium 1.7.  AST 48, ALT 30.

Other LFTs are normal.  B12 and folate are normal.  Drug screen negative. 

RPR is negative.  Lyme disease screen is negative.  Hepatitis A, B, C

serologies negative.  MRA of the brain was noted.  Echocardiogram results

were noted.



IMPRESSION AND PLAN:

1.  Questionable seizure-like activity and questionable history of seizure

disorder.

2.  Questionable and possible major depressive disorder versus personality

disorder versus pseudoseizure.

3.  History of anxiety and panic disorder.

4.  Questionable depression.

5.  Selective mutism versus questionable catatonic state (resolved and

resolving).

6.  Anxiety.

7.  Possible depression, anxiety, and insomnia.

8.  Abnormal EKG with T-wave inversion in V1 to V5.  Questionable anterior

ischemic changes on the EKG.

1.  Questionable episodic seizure versus involuntary generalized movements

of the body.

2.  Nonverbal state.

3.  Questionable catatonia.

4.  Mild lymphocytosis.

5.  Hypercholesterolemia with elevated LDL.

6.  Obesity with elevated body mass index of 33.

7.  Questionable urinary tract infection with proteinuria, microscopic

hematuria, pyuria, bacteriuria.

8.  Abnormal EKG with T-wave inversion V1 to V5.  Questionable anterior

ischemic changes on the EKG.

9.  Questionable history of seizure disorder, seen and treated at [facility] and [facility].  Records are not

available.

10.  Questionable seizure-like activity versus pseudoseizure versus

catatonic state.



The patient was seen by Cardiology and Psychiatry.  Their recommendations

noted.  All of the above was discussed with the patient's , _____

and the medical team.



PLAN:  At this time, the patient will be considered for discharge to

Psychiatry service to 5B if accepted, otherwise the patient may be

discharged home after cleared by Neurology, Psychiatry, and Cardiology and

if EEG is negative, the patient is to follow up with her own PMD,

Neurology, and Psychiatry.  The patient and the family to release all

records from this hospitalization to PMD, Neurology, Psychiatry.  Discharge

restriction:  No alcohol, no smoking, no driving.  Discharge medications

will be as per the discretion of Neurology and Psychiatry.



CURRENT INPATIENT MEDICATIONS:  As per 05/18/2018, Ativan 1 mg IV every 4

hours p.r.n. and Ativan 0.5 mg t.i.d. p.r.n., Ringer lactate at 80 mL an

hour, Levaquin 750 mg daily, Lipitor 40 mg daily, Lovenox 40 mg subcu

daily, Protonix 40 mg daily, Remeron 7.5 at bedtime p.r.n.



As mentioned above, the patient will be considered for discharge to

Psychiatry service to 5B if accepted, otherwise the patient may be

discharged home after cleared by Neurology, Psychiatry, Cardiology and if

EEG is negative, discharge followup with PMD, Neurology, and her own

Psychiatry.  The patient and the family to release all records from this

hospitalization to PMD, Neurology, and Psychiatry.



Time spent in the entire discharge process more than 45 minutes.



Dictated and electronically signed, not read.







__________________________________________

Ernesto Schaffer MD



DD:  05/18/2018 10:53:50

DT:  05/18/2018 12:15:52

Job # [id number]

[id number]